# Patient Record
Sex: FEMALE | Race: BLACK OR AFRICAN AMERICAN | Employment: FULL TIME | URBAN - METROPOLITAN AREA
[De-identification: names, ages, dates, MRNs, and addresses within clinical notes are randomized per-mention and may not be internally consistent; named-entity substitution may affect disease eponyms.]

---

## 2017-01-13 ENCOUNTER — HOSPITAL ENCOUNTER (OUTPATIENT)
Dept: SURGERY | Age: 32
Discharge: HOME | End: 2017-01-13
Attending: OBSTETRICS & GYNECOLOGY | Admitting: OBSTETRICS & GYNECOLOGY

## 2017-01-13 VITALS
OXYGEN SATURATION: 100 % | RESPIRATION RATE: 16 BRPM | BODY MASS INDEX: 28.36 KG/M2 | SYSTOLIC BLOOD PRESSURE: 121 MMHG | HEART RATE: 76 BPM | WEIGHT: 160.05 LBS | DIASTOLIC BLOOD PRESSURE: 83 MMHG | HEIGHT: 63 IN | TEMPERATURE: 97.2 F

## 2017-01-13 DIAGNOSIS — N92.0 MENORRHAGIA WITH REGULAR CYCLE: ICD-10-CM

## 2017-01-13 LAB — HCG(URINE) PREGNANCY TEST: NEGATIVE

## 2017-01-13 RX ORDER — SODIUM CHLORIDE 0.9 % (FLUSH) 0.9 %
10 SYRINGE (ML) INJECTION EVERY 12 HOURS SCHEDULED
Status: DISCONTINUED | OUTPATIENT
Start: 2017-01-13 | End: 2017-01-14 | Stop reason: HOSPADM

## 2017-01-13 RX ORDER — HYDROMORPHONE HCL 110MG/55ML
0.5 PATIENT CONTROLLED ANALGESIA SYRINGE INTRAVENOUS EVERY 5 MIN PRN
Status: DISCONTINUED | OUTPATIENT
Start: 2017-01-13 | End: 2017-01-14 | Stop reason: HOSPADM

## 2017-01-13 RX ORDER — LIDOCAINE HYDROCHLORIDE 10 MG/ML
0.5 INJECTION, SOLUTION EPIDURAL; INFILTRATION; INTRACAUDAL; PERINEURAL ONCE
Status: DISCONTINUED | OUTPATIENT
Start: 2017-01-13 | End: 2017-01-14 | Stop reason: HOSPADM

## 2017-01-13 RX ORDER — SODIUM CHLORIDE, SODIUM LACTATE, POTASSIUM CHLORIDE, CALCIUM CHLORIDE 600; 310; 30; 20 MG/100ML; MG/100ML; MG/100ML; MG/100ML
INJECTION, SOLUTION INTRAVENOUS CONTINUOUS
Status: DISCONTINUED | OUTPATIENT
Start: 2017-01-13 | End: 2017-01-14 | Stop reason: HOSPADM

## 2017-01-13 RX ORDER — FENTANYL CITRATE 50 UG/ML
25 INJECTION, SOLUTION INTRAMUSCULAR; INTRAVENOUS EVERY 5 MIN PRN
Status: DISCONTINUED | OUTPATIENT
Start: 2017-01-13 | End: 2017-01-14 | Stop reason: HOSPADM

## 2017-01-13 RX ORDER — KETOROLAC TROMETHAMINE 30 MG/ML
30 INJECTION, SOLUTION INTRAMUSCULAR; INTRAVENOUS
Status: COMPLETED | OUTPATIENT
Start: 2017-01-13 | End: 2017-01-13

## 2017-01-13 RX ORDER — LABETALOL HYDROCHLORIDE 5 MG/ML
5 INJECTION, SOLUTION INTRAVENOUS EVERY 10 MIN PRN
Status: DISCONTINUED | OUTPATIENT
Start: 2017-01-13 | End: 2017-01-14 | Stop reason: HOSPADM

## 2017-01-13 RX ORDER — LIDOCAINE HYDROCHLORIDE 10 MG/ML
1 INJECTION, SOLUTION EPIDURAL; INFILTRATION; INTRACAUDAL; PERINEURAL
Status: ACTIVE | OUTPATIENT
Start: 2017-01-13 | End: 2017-01-13

## 2017-01-13 RX ORDER — OXYCODONE HYDROCHLORIDE AND ACETAMINOPHEN 5; 325 MG/1; MG/1
1-2 TABLET ORAL EVERY 4 HOURS PRN
Qty: 20 TABLET | Refills: 0 | Status: SHIPPED | OUTPATIENT
Start: 2017-01-13 | End: 2018-01-13

## 2017-01-13 RX ORDER — FENTANYL CITRATE 50 UG/ML
50 INJECTION, SOLUTION INTRAMUSCULAR; INTRAVENOUS EVERY 5 MIN PRN
Status: DISCONTINUED | OUTPATIENT
Start: 2017-01-13 | End: 2017-01-14 | Stop reason: HOSPADM

## 2017-01-13 RX ORDER — MEPERIDINE HYDROCHLORIDE 25 MG/ML
12.5 INJECTION INTRAMUSCULAR; INTRAVENOUS; SUBCUTANEOUS EVERY 5 MIN PRN
Status: DISCONTINUED | OUTPATIENT
Start: 2017-01-13 | End: 2017-01-14 | Stop reason: HOSPADM

## 2017-01-13 RX ORDER — ONDANSETRON 2 MG/ML
4 INJECTION INTRAMUSCULAR; INTRAVENOUS
Status: ACTIVE | OUTPATIENT
Start: 2017-01-13 | End: 2017-01-13

## 2017-01-13 RX ORDER — SODIUM CHLORIDE 0.9 % (FLUSH) 0.9 %
10 SYRINGE (ML) INJECTION PRN
Status: DISCONTINUED | OUTPATIENT
Start: 2017-01-13 | End: 2017-01-14 | Stop reason: HOSPADM

## 2017-01-13 RX ORDER — OXYCODONE HYDROCHLORIDE AND ACETAMINOPHEN 5; 325 MG/1; MG/1
2 TABLET ORAL PRN
Status: COMPLETED | OUTPATIENT
Start: 2017-01-13 | End: 2017-01-13

## 2017-01-13 RX ORDER — HYDROMORPHONE HCL 110MG/55ML
0.25 PATIENT CONTROLLED ANALGESIA SYRINGE INTRAVENOUS EVERY 5 MIN PRN
Status: DISCONTINUED | OUTPATIENT
Start: 2017-01-13 | End: 2017-01-14 | Stop reason: HOSPADM

## 2017-01-13 RX ORDER — SODIUM CHLORIDE, SODIUM LACTATE, POTASSIUM CHLORIDE, CALCIUM CHLORIDE 600; 310; 30; 20 MG/100ML; MG/100ML; MG/100ML; MG/100ML
INJECTION, SOLUTION INTRAVENOUS
Status: COMPLETED
Start: 2017-01-13 | End: 2017-01-13

## 2017-01-13 RX ORDER — OXYCODONE HYDROCHLORIDE AND ACETAMINOPHEN 5; 325 MG/1; MG/1
1 TABLET ORAL PRN
Status: COMPLETED | OUTPATIENT
Start: 2017-01-13 | End: 2017-01-13

## 2017-01-13 RX ORDER — IBUPROFEN 800 MG/1
800 TABLET ORAL EVERY 6 HOURS PRN
Qty: 40 TABLET | Refills: 1 | Status: SHIPPED | OUTPATIENT
Start: 2017-01-13 | End: 2018-01-16 | Stop reason: ALTCHOICE

## 2017-01-13 RX ADMIN — OXYCODONE HYDROCHLORIDE AND ACETAMINOPHEN 1 TABLET: 5; 325 TABLET ORAL at 12:29

## 2017-01-13 RX ADMIN — Medication 0.5 MG: at 11:32

## 2017-01-13 RX ADMIN — SODIUM CHLORIDE, SODIUM LACTATE, POTASSIUM CHLORIDE, CALCIUM CHLORIDE: 600; 310; 30; 20 INJECTION, SOLUTION INTRAVENOUS at 09:53

## 2017-01-13 RX ADMIN — KETOROLAC TROMETHAMINE 30 MG: 30 INJECTION, SOLUTION INTRAMUSCULAR; INTRAVENOUS at 09:54

## 2017-01-13 RX ADMIN — Medication 0.5 MG: at 11:50

## 2017-01-13 ASSESSMENT — PAIN SCALES - GENERAL
PAINLEVEL_OUTOF10: 7
PAINLEVEL_OUTOF10: 6
PAINLEVEL_OUTOF10: 6
PAINLEVEL_OUTOF10: 3
PAINLEVEL_OUTOF10: 5
PAINLEVEL_OUTOF10: 5

## 2017-01-13 ASSESSMENT — PAIN DESCRIPTION - LOCATION
LOCATION: ABDOMEN
LOCATION: ABDOMEN

## 2017-01-13 ASSESSMENT — PAIN DESCRIPTION - PAIN TYPE
TYPE: SURGICAL PAIN
TYPE: SURGICAL PAIN

## 2017-01-13 ASSESSMENT — PAIN - FUNCTIONAL ASSESSMENT: PAIN_FUNCTIONAL_ASSESSMENT: 0-10

## 2017-01-13 ASSESSMENT — PAIN DESCRIPTION - DESCRIPTORS: DESCRIPTORS: CRAMPING

## 2017-04-05 ENCOUNTER — OFFICE VISIT (OUTPATIENT)
Dept: INTERNAL MEDICINE CLINIC | Age: 32
End: 2017-04-05

## 2017-04-05 VITALS
WEIGHT: 164 LBS | OXYGEN SATURATION: 98 % | RESPIRATION RATE: 14 BRPM | BODY MASS INDEX: 29.05 KG/M2 | TEMPERATURE: 98.7 F | DIASTOLIC BLOOD PRESSURE: 62 MMHG | HEART RATE: 65 BPM | SYSTOLIC BLOOD PRESSURE: 100 MMHG

## 2017-04-05 DIAGNOSIS — R39.89 GENITAL SORE: ICD-10-CM

## 2017-04-05 DIAGNOSIS — L50.9 URTICARIA OF ENTIRE BODY: Primary | ICD-10-CM

## 2017-04-05 DIAGNOSIS — G43.909 MIGRAINE WITHOUT STATUS MIGRAINOSUS, NOT INTRACTABLE, UNSPECIFIED MIGRAINE TYPE: ICD-10-CM

## 2017-04-05 PROCEDURE — 36415 COLL VENOUS BLD VENIPUNCTURE: CPT | Performed by: FAMILY MEDICINE

## 2017-04-05 PROCEDURE — 99213 OFFICE O/P EST LOW 20 MIN: CPT | Performed by: FAMILY MEDICINE

## 2017-04-05 RX ORDER — BUTALBITAL, ACETAMINOPHEN AND CAFFEINE 50; 325; 40 MG/1; MG/1; MG/1
1 TABLET ORAL EVERY 6 HOURS PRN
Qty: 60 TABLET | Refills: 1 | Status: SHIPPED | OUTPATIENT
Start: 2017-04-05 | End: 2019-05-14

## 2017-04-06 LAB
ALBUMIN SERPL-MCNC: 4.3 G/DL (ref 3.4–5)
ALP BLD-CCNC: 58 U/L (ref 40–129)
ALT SERPL-CCNC: 16 U/L (ref 10–40)
AST SERPL-CCNC: 21 U/L (ref 15–37)
BASOPHILS ABSOLUTE: 0 K/UL (ref 0–0.2)
BASOPHILS RELATIVE PERCENT: 0.7 %
BILIRUB SERPL-MCNC: 0.5 MG/DL (ref 0–1)
BILIRUBIN DIRECT: <0.2 MG/DL (ref 0–0.3)
BILIRUBIN, INDIRECT: NORMAL MG/DL (ref 0–1)
EOSINOPHILS ABSOLUTE: 0.1 K/UL (ref 0–0.6)
EOSINOPHILS RELATIVE PERCENT: 1.1 %
HCT VFR BLD CALC: 38.5 % (ref 36–48)
HEMOGLOBIN: 12.6 G/DL (ref 12–16)
LYMPHOCYTES ABSOLUTE: 2.4 K/UL (ref 1–5.1)
LYMPHOCYTES RELATIVE PERCENT: 37.4 %
MCH RBC QN AUTO: 30.4 PG (ref 26–34)
MCHC RBC AUTO-ENTMCNC: 32.8 G/DL (ref 31–36)
MCV RBC AUTO: 92.9 FL (ref 80–100)
MONOCYTES ABSOLUTE: 0.4 K/UL (ref 0–1.3)
MONOCYTES RELATIVE PERCENT: 6.9 %
NEUTROPHILS ABSOLUTE: 3.5 K/UL (ref 1.7–7.7)
NEUTROPHILS RELATIVE PERCENT: 53.9 %
PDW BLD-RTO: 13 % (ref 12.4–15.4)
PLATELET # BLD: 219 K/UL (ref 135–450)
PMV BLD AUTO: 10 FL (ref 5–10.5)
RBC # BLD: 4.15 M/UL (ref 4–5.2)
TOTAL PROTEIN: 7.2 G/DL (ref 6.4–8.2)
WBC # BLD: 6.4 K/UL (ref 4–11)

## 2017-04-17 ENCOUNTER — TELEPHONE (OUTPATIENT)
Dept: INTERNAL MEDICINE CLINIC | Age: 32
End: 2017-04-17

## 2017-08-30 DIAGNOSIS — L50.8 URTICARIA, ACUTE: ICD-10-CM

## 2017-08-30 RX ORDER — CETIRIZINE HYDROCHLORIDE 10 MG/1
10 TABLET ORAL DAILY
Qty: 30 TABLET | Refills: 3 | Status: SHIPPED | OUTPATIENT
Start: 2017-08-30 | End: 2018-01-16 | Stop reason: DRUGHIGH

## 2017-12-22 LAB
HPV COMMENT: ABNORMAL
HPV TYPE 16: NOT DETECTED
HPV TYPE 18: NOT DETECTED
HPVOH (OTHER TYPES): DETECTED

## 2018-01-16 ENCOUNTER — OFFICE VISIT (OUTPATIENT)
Dept: INTERNAL MEDICINE CLINIC | Age: 33
End: 2018-01-16

## 2018-01-16 VITALS
DIASTOLIC BLOOD PRESSURE: 58 MMHG | SYSTOLIC BLOOD PRESSURE: 114 MMHG | HEIGHT: 63 IN | BODY MASS INDEX: 30.3 KG/M2 | WEIGHT: 171 LBS | HEART RATE: 68 BPM

## 2018-01-16 DIAGNOSIS — K21.9 GASTROESOPHAGEAL REFLUX DISEASE WITHOUT ESOPHAGITIS: ICD-10-CM

## 2018-01-16 DIAGNOSIS — L50.8 URTICARIA, ACUTE: ICD-10-CM

## 2018-01-16 DIAGNOSIS — L29.9 ITCHING: ICD-10-CM

## 2018-01-16 DIAGNOSIS — R07.9 CHEST PAIN, UNSPECIFIED TYPE: Primary | ICD-10-CM

## 2018-01-16 LAB
BILIRUBIN, POC: NORMAL
BLOOD URINE, POC: NORMAL
CLARITY, POC: CLEAR
COLOR, POC: NORMAL
GLUCOSE URINE, POC: NORMAL
KETONES, POC: NORMAL
LEUKOCYTE EST, POC: NORMAL
NITRITE, POC: NORMAL
PH, POC: 5
PROTEIN, POC: NORMAL
SPECIFIC GRAVITY, POC: 1.02
UROBILINOGEN, POC: 1

## 2018-01-16 PROCEDURE — 93000 ELECTROCARDIOGRAM COMPLETE: CPT | Performed by: INTERNAL MEDICINE

## 2018-01-16 PROCEDURE — 81002 URINALYSIS NONAUTO W/O SCOPE: CPT | Performed by: INTERNAL MEDICINE

## 2018-01-16 PROCEDURE — 99205 OFFICE O/P NEW HI 60 MIN: CPT | Performed by: INTERNAL MEDICINE

## 2018-01-16 RX ORDER — OMEPRAZOLE 20 MG/1
20 CAPSULE, DELAYED RELEASE ORAL
Qty: 30 CAPSULE | Refills: 3 | Status: SHIPPED | OUTPATIENT
Start: 2018-01-16 | End: 2018-07-26

## 2018-01-16 RX ORDER — CETIRIZINE HYDROCHLORIDE 10 MG/1
10 TABLET ORAL DAILY
Qty: 30 TABLET | Refills: 3 | Status: SHIPPED | OUTPATIENT
Start: 2018-01-16 | End: 2019-05-14

## 2018-01-16 NOTE — PROGRESS NOTES
Subjective:      Patient ID: Araceli Soler is a 34 y.o. female. HPI-c/o generalized pruritis without rash x 14 months, on QOD Zyrtec, PMD Sharon Mayer  \"wants to know what is causing the itching\"    C/o chest pain with eating, then wants RUQ US done looking for gallstones since she ate fried food before several episodes  Never smoker  Review of Systems-Hospice RN at [facility] Prescriptions on File Prior to Visit   Medication Sig Dispense Refill    cetirizine (ZYRTEC) 10 MG tablet Take 1 tablet by mouth daily (Patient taking differently: Take 10 mg by mouth every other day ) 30 tablet 3    ibuprofen (ADVIL;MOTRIN) 800 MG tablet Take 1 tablet by mouth every 6 hours as needed for Pain 40 tablet 1    butalbital-acetaminophen-caffeine (FIORICET, ESGIC) -40 MG per tablet Take 1 tablet by mouth every 6 hours as needed for Migraine 60 tablet 1     No current facility-administered medications on file prior to visit. Objective:   Physical Exam   Constitutional: She is oriented to person, place, and time. She appears well-developed and well-nourished. BP (!) 114/58   Pulse 68   Ht 5' 3\" (1.6 m)   Wt 171 lb (77.6 kg)   Breastfeeding? No   BMI 30.29 kg/m²      Neck: No JVD present. No thyromegaly present. Cardiovascular: Normal rate, regular rhythm and normal heart sounds. Pulmonary/Chest: Effort normal and breath sounds normal.   Genitourinary: No vaginal discharge found. Musculoskeletal: Normal range of motion. She exhibits no edema or tenderness. Neurological: She is alert and oriented to person, place, and time. She has normal reflexes. Psychiatric: She has a normal mood and affect. Her behavior is normal.   Nursing note and vitals reviewed.     UA-normal  EKG-normal    Assessment:      Patient Active Problem List   Diagnosis    SAB (spontaneous )    S/P D&C (status post dilation and curettage)    Hyperlipemia    Hx LEEP (loop electrosurgical excision procedure), cervix, pregnancy    Menorrhagia with regular cycle    Itching           Plan:      OK to take Zyrtec daily    Check labs, LFT's and eosinophils looking for cause for itching    Upper GI due to dysphagia complaints  RUQ US due to fatty food intolerance  Take Omeprazole 20 mg QD    F/u in 1 month    Amber Wilson

## 2018-01-25 ENCOUNTER — TELEPHONE (OUTPATIENT)
Dept: INTERNAL MEDICINE CLINIC | Age: 33
End: 2018-01-25

## 2018-02-02 ENCOUNTER — OFFICE VISIT (OUTPATIENT)
Dept: INTERNAL MEDICINE CLINIC | Age: 33
End: 2018-02-02

## 2018-02-02 VITALS
DIASTOLIC BLOOD PRESSURE: 58 MMHG | WEIGHT: 171 LBS | BODY MASS INDEX: 30.3 KG/M2 | HEIGHT: 63 IN | SYSTOLIC BLOOD PRESSURE: 102 MMHG | HEART RATE: 64 BPM

## 2018-02-02 DIAGNOSIS — K21.9 GASTROESOPHAGEAL REFLUX DISEASE, ESOPHAGITIS PRESENCE NOT SPECIFIED: Primary | ICD-10-CM

## 2018-02-02 PROCEDURE — 99213 OFFICE O/P EST LOW 20 MIN: CPT | Performed by: INTERNAL MEDICINE

## 2018-02-02 RX ORDER — ATORVASTATIN CALCIUM 10 MG/1
10 TABLET, FILM COATED ORAL DAILY
Qty: 90 TABLET | Refills: 3 | Status: SHIPPED | OUTPATIENT
Start: 2018-02-02 | End: 2018-12-13 | Stop reason: SINTOL

## 2018-02-02 NOTE — PROGRESS NOTES
Subjective:      Patient ID: Shannon Collier is a 34 y.o. female. HPI-wants to see GI re: GERD, she increased PPI herself to 40 mg QD omeprazole since \"20 mg AD wasn't working\", didn't do UGI due to $ issues  RUQ US was WNL  STILL C/O ITCHING x 1.5 years, every day all day, no rash, no new medications    Needs to review labs from last visit    Review of Systems -[facility] was WNL    Wt Readings from Last 3 Encounters:   02/02/18 171 lb (77.6 kg)   01/16/18 171 lb (77.6 kg)   04/05/17 164 lb (74.4 kg)       Current Outpatient Prescriptions on File Prior to Visit   Medication Sig Dispense Refill    cetirizine (ZYRTEC) 10 MG tablet Take 1 tablet by mouth daily 30 tablet 3    omeprazole (PRILOSEC) 20 MG delayed release capsule Take 1 capsule by mouth every morning (before breakfast) (Patient taking differently: Take 40 mg by mouth every morning (before breakfast) ) 30 capsule 3    butalbital-acetaminophen-caffeine (FIORICET, ESGIC) -40 MG per tablet Take 1 tablet by mouth every 6 hours as needed for Migraine 60 tablet 1     No current facility-administered medications on file prior to visit. Objective:   Physical Exam   Constitutional: She is oriented to person, place, and time. She appears well-developed and well-nourished. BP (!) 102/58   Pulse 64   Ht 5' 3\" (1.6 m)   Wt 171 lb (77.6 kg)   BMI 30.29 kg/m²      Neck: No JVD present. No thyromegaly present. Cardiovascular: Normal rate, regular rhythm and normal heart sounds. Pulmonary/Chest: Effort normal and breath sounds normal.   Genitourinary: No vaginal discharge found. Musculoskeletal: Normal range of motion. She exhibits no edema or tenderness. Neurological: She is alert and oriented to person, place, and time. She has normal reflexes. Psychiatric: She has a normal mood and affect. Her behavior is normal.   Nursing note and vitals reviewed.     Lab Results   Component Value Date    WBC 6.4 04/05/2017    HGB 12.6 04/05/2017    HCT

## 2018-03-08 ENCOUNTER — OFFICE VISIT (OUTPATIENT)
Dept: ORTHOPEDIC SURGERY | Age: 33
End: 2018-03-08

## 2018-03-08 VITALS
DIASTOLIC BLOOD PRESSURE: 66 MMHG | SYSTOLIC BLOOD PRESSURE: 101 MMHG | WEIGHT: 166 LBS | BODY MASS INDEX: 29.41 KG/M2 | HEIGHT: 63 IN | HEART RATE: 64 BPM

## 2018-03-08 DIAGNOSIS — S83.002A PATELLAR SUBLUXATION, LEFT, INITIAL ENCOUNTER: ICD-10-CM

## 2018-03-08 DIAGNOSIS — M25.562 ACUTE PAIN OF BOTH KNEES: Primary | ICD-10-CM

## 2018-03-08 DIAGNOSIS — M25.561 ACUTE PAIN OF BOTH KNEES: Primary | ICD-10-CM

## 2018-03-08 DIAGNOSIS — S83.001A PATELLAR SUBLUXATION, RIGHT, INITIAL ENCOUNTER: ICD-10-CM

## 2018-03-08 PROCEDURE — 99203 OFFICE O/P NEW LOW 30 MIN: CPT | Performed by: PHYSICIAN ASSISTANT

## 2018-03-08 PROCEDURE — L1812 KO ELASTIC W/JOINTS PRE OTS: HCPCS | Performed by: PHYSICIAN ASSISTANT

## 2018-03-08 NOTE — PATIENT INSTRUCTIONS
liability for your use of this information. Patellofemoral Pain Syndrome: Care Instructions  Your Care Instructions  Patellofemoral pain syndrome is pain in the front of the knee. It is caused by overuse, weak thigh muscles (quadriceps), or a problem with the way the kneecap moves. Extra weight may also cause this syndrome. The patella is the kneecap, and the femur is the thighbone. Some people may have pain in the front of the knee from a condition called chondromalacia. In this problem, the underside of the knee cartilage wears down and frays. Cartilage is a rubbery tissue that cushions joints. In some cases, the kneecap does not move, or track, in a normal way. You may have knee pain when you run, walk down hills or steps, or do another activity. Sitting for a long time also can cause knee pain. Your knee pain may get better with medicines for pain and swelling and exercises to make your quadriceps stronger. Losing weight, if you need to, may also help with pain. Follow-up care is a key part of your treatment and safety. Be sure to make and go to all appointments, and call your doctor if you are having problems. It's also a good idea to know your test results and keep a list of the medicines you take. How can you care for yourself at home? · Ask your doctor if you can take an over-the-counter pain medicine, such as acetaminophen (Tylenol), ibuprofen (Advil, Motrin), or naproxen (Aleve). Be safe with medicines. Read and follow all instructions on the label. · Rest and protect your knee. Take a break from activities that cause pain, such as long periods of sitting or kneeling. · Put ice or a cold pack on your knee for 10 to 20 minutes after activity. Put a thin cloth between the ice and your skin. · If your doctor recommends an elastic bandage, sleeve, or other type of support for your knee, wear it as directed.   · If your knee is not swollen, you can put moist heat, a heating pad, or a warm cloth

## 2018-03-08 NOTE — PROGRESS NOTES
understanding of these issues and questions were answered. I am recommending a J brace for the left knee since left knee is more symptomatic. Procedures    OTS Hinged Lateral Stabilizer     Patient was prescribed a Breg Hinged Lateral Stabilizer. The left knee will require stabilization / immobilization from this semi-rigid / rigid orthosis to improve their function. The orthosis will assist in protecting the affected area, provide functional support and facilitate healing. The patient was educated and fit by a healthcare professional with expert knowledge and specialization in brace application while under the direct supervision of the physician. Verbal and written instructions for the use of and application of this item were provided. They were instructed to contact the office immediately should the brace result in increased pain, decreased sensation, increased swelling or worsening of the condition. The need for a therapy rehab program for quad/ VMO strengthening discussed. PT prescription was given. Follow Up: 6 weeks. Call or return to clinic prn if these symptoms worsen or fail to improve as anticipated.

## 2018-03-20 NOTE — COMMUNICATION BODY
Subjective:      Patient ID: Kiana Cabezas is a 34 y.o.  female. Chief Complaint   Patient presents with    Knee Pain     Bilateral knee pain started after running         HPI:   She is here for an initial evaluation of bilateral anterior knee pain. Onset of symptoms 3 weeks. These symptoms have been progressive in nature. There is not a history of injury. Recently started running on a treadmill for exercise. Pain is intermittent, moderate. Location of pain- anterior knee. Pain is on average 5/10. Left knee worse than right knee  Pain is worse with steps, squats,inclines and declines. Pain improves with rest, elevation and ice. There is not associated numbness/ tingling. Previous treatments have included: Tylenol without relief or improvement. Review of Systems:   Negative for fever or chills. Negative for numbness or tingling in the lower extremity.       Past Medical History:   Diagnosis Date    Hyperlipemia     Hyperlipidemia LDL goal <160        Family History   Problem Relation Age of Onset    Diabetes Mother      \"pre\"    Crohn's Disease Other      maternal uncle/cousin    Diabetes Paternal Grandfather     Bipolar Disorder Paternal Grandmother     Diabetes Maternal Aunt      \"pre\"        Past Surgical History:   Procedure Laterality Date    DILATION AND CURETTAGE OF UTERUS  2007    DILATION AND CURETTAGE OF UTERUS  9/11/13    HYSTEROSCOPY  01/13/2017    endometrial ablation- prem ESPINOSA  2003 & 2005    SALPINGO-OOPHORECTOMY Left 2015    1.7 cm fibroma- Dr Jessica Giles Bilateral 2014       Social History     Occupational History    nurse      hospice      Social History Main Topics    Smoking status: Never Smoker    Smokeless tobacco: Never Used    Alcohol use 3.0 oz/week     5 Glasses of wine per week      Comment: once a month (1-2 [place] ice tea/month)    Drug use: No    Sexual activity: Yes       Current Outpatient Prescriptions flexors and EHL testing. Gait is not antalgic. Normal heel to toe. Negative Lamar's Sign. SLR negative. VASCULAR EXAM:  Examination of the lower extremities shows intact perfusion to all extremities. No cyanosis. Digits are warm to touch, capillary refill is less than 2 seconds. no edema noted. SKIN:  Examination of the lower extremity skin reveals: The skin to be intact without lacerations or abrasions. No significant erythema. No rashes or skin lesions. X Rays: performed in the office today:   AP Standing,Lateral and Sunrise of bilateral Knee: Mild patellar subluxation noted. Otherwise, the alignment is anatomic. There are no radiographic findings to suggest fracture or dislocation. Assessment:       ICD-10-CM ICD-9-CM    1. Acute pain of both knees M25.561 338.19 XR Knee Bilateral Standing    M25.562 719.46 XR KNEE LEFT (1-2 VIEWS)      XR KNEE RIGHT (1-2 VIEWS)   2. Patellar subluxation, left, initial encounter S83.002A 836.3 OTS Hinged Lateral Stabilizer   3. Patellar subluxation, right, initial encounter S83.001A 836.3         Plan:     The natural history of the patient's diagnosis as well as the treatment options were discussed in full and questions were answered. Risks and benefits of the treatment options also reviewed in detail. Rest, Ice, Compression and Elevation  OTC NSAID'S discussed to be taken in appropriate  therapeutic doses. Activity restriction/ Modification discussed. Avoidance of deep knee bends, squats and steps    The patient was advised that NSAID-type medications have two very important potential side effects: gastrointestinal irritation including hemorrhage and renal injuries. She was asked to take the medication with food and to stop if she experiences any GI upset. I asked her to call for vomiting, abdominal pain or black/bloody stools. He should have renal function testing per his medical provider periodically.   The patient expresses understanding of these issues and questions were answered. I am recommending a J brace for the left knee since left knee is more symptomatic. Procedures    OTS Hinged Lateral Stabilizer     Patient was prescribed a Breg Hinged Lateral Stabilizer. The left knee will require stabilization / immobilization from this semi-rigid / rigid orthosis to improve their function. The orthosis will assist in protecting the affected area, provide functional support and facilitate healing. The patient was educated and fit by a healthcare professional with expert knowledge and specialization in brace application while under the direct supervision of the physician. Verbal and written instructions for the use of and application of this item were provided. They were instructed to contact the office immediately should the brace result in increased pain, decreased sensation, increased swelling or worsening of the condition. The need for a therapy rehab program for quad/ VMO strengthening discussed. PT prescription was given. Follow Up: 6 weeks. Call or return to clinic prn if these symptoms worsen or fail to improve as anticipated.

## 2018-07-26 ENCOUNTER — HOSPITAL ENCOUNTER (OUTPATIENT)
Dept: OTHER | Age: 33
Discharge: HOME | End: 2018-07-26
Attending: INTERNAL MEDICINE | Admitting: INTERNAL MEDICINE

## 2018-07-26 ENCOUNTER — OFFICE VISIT (OUTPATIENT)
Dept: INTERNAL MEDICINE CLINIC | Age: 33
End: 2018-07-26

## 2018-07-26 VITALS
HEIGHT: 63 IN | SYSTOLIC BLOOD PRESSURE: 110 MMHG | HEART RATE: 76 BPM | DIASTOLIC BLOOD PRESSURE: 64 MMHG | WEIGHT: 169 LBS | BODY MASS INDEX: 29.95 KG/M2

## 2018-07-26 DIAGNOSIS — E78.00 PURE HYPERCHOLESTEROLEMIA: Primary | ICD-10-CM

## 2018-07-26 DIAGNOSIS — K21.9 GASTROESOPHAGEAL REFLUX DISEASE, ESOPHAGITIS PRESENCE NOT SPECIFIED: ICD-10-CM

## 2018-07-26 DIAGNOSIS — E78.00 PURE HYPERCHOLESTEROLEMIA: ICD-10-CM

## 2018-07-26 LAB
A/G RATIO: 1.3 (ref 1.1–2.2)
ALBUMIN SERPL-MCNC: 4.4 G/DL (ref 3.4–5)
ALP BLD-CCNC: 57 U/L (ref 40–129)
ALT SERPL-CCNC: 17 U/L (ref 10–40)
AMYLASE: 56 U/L (ref 25–115)
ANION GAP SERPL CALCULATED.3IONS-SCNC: 8 MMOL/L (ref 3–16)
AST SERPL-CCNC: 19 U/L (ref 15–37)
BILIRUB SERPL-MCNC: 0.5 MG/DL (ref 0–1)
BUN BLDV-MCNC: 13 MG/DL (ref 7–20)
CALCIUM SERPL-MCNC: 9.6 MG/DL (ref 8.3–10.6)
CHLORIDE BLD-SCNC: 106 MMOL/L (ref 99–110)
CHOLESTEROL, TOTAL: 248 MG/DL (ref 0–199)
CO2: 24 MMOL/L (ref 21–32)
CREAT SERPL-MCNC: 0.9 MG/DL (ref 0.6–1.1)
GFR AFRICAN AMERICAN: >60
GFR NON-AFRICAN AMERICAN: >60
GLOBULIN: 3.4 G/DL
GLUCOSE BLD-MCNC: 91 MG/DL (ref 70–99)
HCT VFR BLD CALC: 39.4 % (ref 36–48)
HDLC SERPL-MCNC: 72 MG/DL (ref 40–60)
HEMOGLOBIN: 13.1 G/DL (ref 12–16)
LDL CHOLESTEROL CALCULATED: 167 MG/DL
MCH RBC QN AUTO: 30.5 PG (ref 26–34)
MCHC RBC AUTO-ENTMCNC: 33.2 G/DL (ref 31–36)
MCV RBC AUTO: 91.8 FL (ref 80–100)
PDW BLD-RTO: 13.9 % (ref 12.4–15.4)
PLATELET # BLD: 227 K/UL (ref 135–450)
PMV BLD AUTO: 10.2 FL (ref 5–10.5)
POTASSIUM SERPL-SCNC: 4.4 MMOL/L (ref 3.5–5.1)
RBC # BLD: 4.29 M/UL (ref 4–5.2)
SODIUM BLD-SCNC: 138 MMOL/L (ref 136–145)
TOTAL PROTEIN: 7.8 G/DL (ref 6.4–8.2)
TRIGL SERPL-MCNC: 46 MG/DL (ref 0–150)
VLDLC SERPL CALC-MCNC: 9 MG/DL
WBC # BLD: 9.2 K/UL (ref 4–11)

## 2018-07-26 PROCEDURE — 99214 OFFICE O/P EST MOD 30 MIN: CPT | Performed by: INTERNAL MEDICINE

## 2018-07-26 NOTE — PROGRESS NOTES
Subjective:      Patient ID: Gwenith Niru is a 34 y.o. female. HPI-wants to restart prescription med for her GERD symptoms    XS gas with any foods    Was restarted on Omeprazole 40 mg QD 5 months ago and was referred to GI then but she never went    Then c/o lactose intolerance issues as well    Refused UGI due to co-pay $    Review of Systems     Current Outpatient Prescriptions on File Prior to Visit   Medication Sig Dispense Refill    naproxen (NAPROSYN) 500 MG tablet Take 1 tablet by mouth 2 times daily 60 tablet 0    ondansetron (ZOFRAN ODT) 4 MG disintegrating tablet Take 1 tablet by mouth every 8 hours as needed for Nausea 20 tablet 0    atorvastatin (LIPITOR) 10 MG tablet Take 1 tablet by mouth daily 90 tablet 3    cetirizine (ZYRTEC) 10 MG tablet Take 1 tablet by mouth daily 30 tablet 3    omeprazole (PRILOSEC) 20 MG delayed release capsule Take 1 capsule by mouth every morning (before breakfast) (Patient taking differently: Take 40 mg by mouth every morning (before breakfast) ) 30 capsule 3    butalbital-acetaminophen-caffeine (FIORICET, ESGIC) -40 MG per tablet Take 1 tablet by mouth every 6 hours as needed for Migraine 60 tablet 1     No current facility-administered medications on file prior to visit. Objective:   Physical Exam   Constitutional: She is oriented to person, place, and time. She appears well-developed and well-nourished. /64   Pulse 76   Ht 5' 3\" (1.6 m)   Wt 169 lb (76.7 kg)   BMI 29.94 kg/m²        Neck: No JVD present. No thyromegaly present. Cardiovascular: Normal rate, regular rhythm and normal heart sounds. Pulmonary/Chest: Effort normal and breath sounds normal.   Genitourinary: No vaginal discharge found. Musculoskeletal: Normal range of motion. She exhibits no edema or tenderness. Neurological: She is alert and oriented to person, place, and time. She has normal reflexes. Psychiatric: She has a normal mood and affect.  Her behavior is normal.   Nursing note and vitals reviewed. Lab Results   Component Value Date    CHOL 211 (H) 2015    CHOL 246 (H) 10/12/2013    CHOL 241 (H) 2013     Lab Results   Component Value Date    TRIG 78 2015    TRIG 61 10/12/2013    TRIG 41 2013     Lab Results   Component Value Date    HDL 45 2015    HDL 58 10/12/2013    HDL 53 2013     Lab Results   Component Value Date    LDLCALC 150 (H) 2015    LDLCALC 176 (H) 10/12/2013    LDLCALC 180 (H) 2013     Lab Results   Component Value Date    LABVLDL 16 2015    LABVLDL 12 10/12/2013    LABVLDL 8 2013     No results found for: CHOLHDLRATIO    Assessment:      Patient Active Problem List   Diagnosis    SAB (spontaneous )    S/P D&C (status post dilation and curettage)    Hyperlipemia    Hx LEEP (loop electrosurgical excision procedure), cervix, pregnancy    Menorrhagia with regular cycle    Itching    Gastroesophageal reflux disease without esophagitis    Patellar subluxation, left, initial encounter    Acute pain of both knees           Plan: Add high fiber diet Metamucil or Citrucel or Benefiber  Encourage po fluids    GI for EGD, ?  IBS    Restart omeprazole due to recurrent GERD symptoms    Repeat fasting labs since on Lipitor for past 6 months  Add amylase due to epigastric distress reports today     f/u in 1 month    Tanya Marte

## 2018-07-30 ENCOUNTER — TELEPHONE (OUTPATIENT)
Dept: INTERNAL MEDICINE CLINIC | Age: 33
End: 2018-07-30

## 2018-07-30 NOTE — TELEPHONE ENCOUNTER
----- Message from Sacha Cadena MD sent at 7/27/2018 10:46 AM EDT -----  Labs unremarkable  Follow-up as scheduled    Sacha Cadena

## 2018-09-28 LAB
CANDIDA SPECIES, DNA PROBE: ABNORMAL
GARDNERELLA VAGINALIS, DNA PROBE: ABNORMAL
TRICHOMONAS VAGINALIS DNA: ABNORMAL

## 2018-11-03 LAB
CANDIDA SPECIES, DNA PROBE: NORMAL
GARDNERELLA VAGINALIS, DNA PROBE: NORMAL
TRICHOMONAS VAGINALIS DNA: NORMAL

## 2018-11-15 ENCOUNTER — OFFICE VISIT (OUTPATIENT)
Dept: INTERNAL MEDICINE CLINIC | Age: 33
End: 2018-11-15

## 2018-11-15 VITALS
HEART RATE: 82 BPM | BODY MASS INDEX: 30.29 KG/M2 | OXYGEN SATURATION: 99 % | SYSTOLIC BLOOD PRESSURE: 110 MMHG | WEIGHT: 171 LBS | DIASTOLIC BLOOD PRESSURE: 60 MMHG

## 2018-11-15 DIAGNOSIS — F33.8 SEASONAL AFFECTIVE DISORDER (HCC): Primary | ICD-10-CM

## 2018-11-15 PROCEDURE — 99213 OFFICE O/P EST LOW 20 MIN: CPT | Performed by: INTERNAL MEDICINE

## 2018-11-15 RX ORDER — CITALOPRAM 20 MG/1
20 TABLET ORAL DAILY
Qty: 30 TABLET | Refills: 0 | Status: CANCELLED | OUTPATIENT
Start: 2018-11-15

## 2018-11-15 RX ORDER — PAROXETINE HYDROCHLORIDE 25 MG/1
25 TABLET, FILM COATED, EXTENDED RELEASE ORAL DAILY
Qty: 30 TABLET | Refills: 0 | Status: SHIPPED | OUTPATIENT
Start: 2018-11-15 | End: 2018-12-13 | Stop reason: SINTOL

## 2018-11-16 ENCOUNTER — HOSPITAL ENCOUNTER (OUTPATIENT)
Age: 33
Discharge: HOME | End: 2018-11-16
Payer: COMMERCIAL

## 2018-11-16 DIAGNOSIS — F33.8 SEASONAL AFFECTIVE DISORDER (HCC): ICD-10-CM

## 2018-11-16 LAB — TSH SERPL DL<=0.05 MIU/L-ACNC: 0.68 UIU/ML (ref 0.27–4.2)

## 2018-11-16 PROCEDURE — 36415 COLL VENOUS BLD VENIPUNCTURE: CPT

## 2018-11-16 PROCEDURE — 84443 ASSAY THYROID STIM HORMONE: CPT

## 2018-11-19 ENCOUNTER — TELEPHONE (OUTPATIENT)
Dept: INTERNAL MEDICINE CLINIC | Age: 33
End: 2018-11-19

## 2018-11-19 NOTE — TELEPHONE ENCOUNTER
----- Message from Esperanza Carvajal MD sent at 11/16/2018  6:02 PM EST -----  Thyroid is OK      F/u as planned    Esperanza Carvajal

## 2018-12-13 ENCOUNTER — OFFICE VISIT (OUTPATIENT)
Dept: INTERNAL MEDICINE CLINIC | Age: 33
End: 2018-12-13

## 2018-12-13 VITALS
DIASTOLIC BLOOD PRESSURE: 60 MMHG | WEIGHT: 171 LBS | BODY MASS INDEX: 30.3 KG/M2 | SYSTOLIC BLOOD PRESSURE: 104 MMHG | HEART RATE: 68 BPM | HEIGHT: 63 IN

## 2018-12-13 PROCEDURE — 99214 OFFICE O/P EST MOD 30 MIN: CPT | Performed by: INTERNAL MEDICINE

## 2018-12-13 RX ORDER — BUPROPION HYDROCHLORIDE 150 MG/1
150 TABLET, EXTENDED RELEASE ORAL 2 TIMES DAILY
Qty: 60 TABLET | Refills: 3 | Status: SHIPPED | OUTPATIENT
Start: 2018-12-13 | End: 2019-02-28

## 2018-12-28 LAB
HPV COMMENT: NORMAL
HPV TYPE 16: NOT DETECTED
HPV TYPE 18: NOT DETECTED
HPVOH (OTHER TYPES): NOT DETECTED

## 2019-01-23 LAB — URINE CULTURE, ROUTINE: NORMAL

## 2019-02-28 ENCOUNTER — OFFICE VISIT (OUTPATIENT)
Dept: INTERNAL MEDICINE CLINIC | Age: 34
End: 2019-02-28

## 2019-02-28 VITALS — DIASTOLIC BLOOD PRESSURE: 60 MMHG | SYSTOLIC BLOOD PRESSURE: 110 MMHG

## 2019-02-28 DIAGNOSIS — M54.2 CERVICALGIA: Primary | ICD-10-CM

## 2019-02-28 PROCEDURE — 99214 OFFICE O/P EST MOD 30 MIN: CPT | Performed by: INTERNAL MEDICINE

## 2019-02-28 RX ORDER — TIZANIDINE 4 MG/1
4 TABLET ORAL 3 TIMES DAILY
Qty: 30 TABLET | Refills: 0 | Status: SHIPPED | OUTPATIENT
Start: 2019-02-28 | End: 2019-05-14

## 2019-02-28 RX ORDER — PREDNISONE 10 MG/1
TABLET ORAL
Qty: 53 TABLET | Refills: 0 | Status: SHIPPED | OUTPATIENT
Start: 2019-02-28 | End: 2019-03-10

## 2019-03-14 ENCOUNTER — OFFICE VISIT (OUTPATIENT)
Dept: INTERNAL MEDICINE CLINIC | Age: 34
End: 2019-03-14

## 2019-03-14 VITALS
BODY MASS INDEX: 30.3 KG/M2 | SYSTOLIC BLOOD PRESSURE: 114 MMHG | WEIGHT: 171 LBS | DIASTOLIC BLOOD PRESSURE: 62 MMHG | HEIGHT: 63 IN | HEART RATE: 64 BPM

## 2019-03-14 DIAGNOSIS — F31.31 BIPOLAR AFFECTIVE DISORDER, CURRENTLY DEPRESSED, MILD (HCC): ICD-10-CM

## 2019-03-14 DIAGNOSIS — M54.2 CERVICALGIA: Primary | ICD-10-CM

## 2019-03-14 PROCEDURE — 99213 OFFICE O/P EST LOW 20 MIN: CPT | Performed by: INTERNAL MEDICINE

## 2019-04-11 ENCOUNTER — OFFICE VISIT (OUTPATIENT)
Dept: INTERNAL MEDICINE CLINIC | Age: 34
End: 2019-04-11

## 2019-04-11 VITALS
DIASTOLIC BLOOD PRESSURE: 68 MMHG | WEIGHT: 170 LBS | OXYGEN SATURATION: 100 % | HEART RATE: 82 BPM | TEMPERATURE: 98.6 F | BODY MASS INDEX: 30.11 KG/M2 | SYSTOLIC BLOOD PRESSURE: 110 MMHG

## 2019-04-11 DIAGNOSIS — J02.9 PHARYNGITIS, UNSPECIFIED ETIOLOGY: Primary | ICD-10-CM

## 2019-04-11 PROCEDURE — 99214 OFFICE O/P EST MOD 30 MIN: CPT | Performed by: INTERNAL MEDICINE

## 2019-04-11 RX ORDER — PENICILLIN V POTASSIUM 500 MG/1
500 TABLET ORAL 4 TIMES DAILY
Qty: 40 TABLET | Refills: 0 | Status: SHIPPED | OUTPATIENT
Start: 2019-04-11 | End: 2019-04-21

## 2019-04-11 NOTE — PROGRESS NOTES
Subungual infection/nodule ? etiology      Plan:      WESLEY CUEVAS QID    ENT evaluation     Ellen Chamberlain MD

## 2019-04-12 ENCOUNTER — TELEPHONE (OUTPATIENT)
Dept: INTERNAL MEDICINE CLINIC | Age: 34
End: 2019-04-12

## 2019-04-12 NOTE — TELEPHONE ENCOUNTER
Pt called given antib here yesterday  However, she says she gets yeast infections and would like Diflucan called in to [facility]  rsl

## 2019-04-12 NOTE — TELEPHONE ENCOUNTER
Advise OTC Monistat-7 vag cream at hs instead if she develops any Candida symptoms  This antibiotic would be less likely to cause yeast infection)    Shea Rios

## 2019-04-17 ENCOUNTER — OFFICE VISIT (OUTPATIENT)
Dept: ENT CLINIC | Age: 34
End: 2019-04-17
Payer: COMMERCIAL

## 2019-04-17 VITALS
BODY MASS INDEX: 31.01 KG/M2 | DIASTOLIC BLOOD PRESSURE: 69 MMHG | WEIGHT: 175 LBS | HEIGHT: 63 IN | SYSTOLIC BLOOD PRESSURE: 101 MMHG | HEART RATE: 64 BPM

## 2019-04-17 DIAGNOSIS — R59.0 LYMPHADENOPATHY, SUBMENTAL: ICD-10-CM

## 2019-04-17 DIAGNOSIS — H93.13 SUBJECTIVE TINNITUS OF BOTH EARS: ICD-10-CM

## 2019-04-17 DIAGNOSIS — R22.1 NECK MASS: Primary | ICD-10-CM

## 2019-04-17 PROCEDURE — 99203 OFFICE O/P NEW LOW 30 MIN: CPT | Performed by: OTOLARYNGOLOGY

## 2019-04-17 RX ORDER — LAMOTRIGINE 100 MG/1
100 TABLET ORAL DAILY
COMMUNITY
Start: 2019-04-15

## 2019-04-17 RX ORDER — LAMOTRIGINE 25 MG/1
TABLET ORAL
Refills: 0 | COMMUNITY
Start: 2019-03-26 | End: 2019-05-14

## 2019-04-17 NOTE — PATIENT INSTRUCTIONS
1. Finish your current antibiotic. 2. Schedule a hearing test.      3. We discussed the alternatives of surgical excision, with resultant scar, versus fine needle aspiration for cytology study, versus observation for enlargement of 2 - 3 times, or a change to rock hard consistency, or fixation (nodule no longer moves around). You chose observation at this time. Check once a week and report any of these changes.

## 2019-04-17 NOTE — PROGRESS NOTES
[facility]       NEW PATIENT VISIT    PCP:  Michael Plummer MD    REFERRED BY:   Michael Plummer MD        CHIEF COMPLAINT:  Chief Complaint   Patient presents with    Pharyngitis       HISTORY OF PRESENT ILLNESS:       (Location, Quality, Severity, Duration, Timing, Context, Modifying factors, Associated symptoms)  Trey Valdez is a 28 y.o. female. A consultation was requested by Dr. Andree Barry for an opinion or advice regarding a problem located in the neck, submental area. The quality is lump, \"like a hard knot. \"  The severity is mild. The duration is 8 days. \"It appeared on April 9th. It was painful so I started rubbing around there and felt that knot and it was very painful for 3 days. I saw Dr. Andree Barry on 4/11 and he prescribed penicillin. \"  Since then, it has decreased in size and pain. \"It's not painful or tender today. \"  The timing is constant. It is much smaller\", at about \"15%\" of maximum size. The context is spontaneous onset with no sore throat or any other symptoms. Modifying factors include antibiotic. Associated symptoms include none. REVIEW OF SYSTEMS:    CONSTITUTIONAL:  Denied fever. Denied unexplained weight loss. EARS, NOSE, THROAT:  (+) tinnitus, ringing or high pitch sound, both ears, for two weeks, and never before. Denied otorrhea, otalgia, hearing loss, nasal dyspnea, rhinorrhea, sore throat and hoarseness. PAST MEDICAL HISTORY:    Past Medical History:   Diagnosis Date    Hyperlipemia     Hyperlipidemia LDL goal <160          Past Surgical History:   Procedure Laterality Date    DILATION AND CURETTAGE OF UTERUS  2007    DILATION AND CURETTAGE OF UTERUS  9/11/13    HYSTEROSCOPY  01/13/2017    endometrial ablation- [facility]    LEEPATRICIA  2003 & 2005    SALPINGO-OOPHORECTOMY Left 2015    1.7 cm fibroma- Dr Jeb Bear Bilateral 2014         EXAMINATION:    VITALS SIGNS reviewed.    Vitals:    04/17/19 0955   BP: 101/69 Site: Left Upper Arm   Position: Sitting   Cuff Size: Medium Adult   Pulse: 64   Weight: 175 lb (79.4 kg)   Height: 5' 3\" (1.6 m)     GENERAL APPEARANCE: WDWN NAD, A&Ox3. ABILITY TO COMMUNICATE/QUALITY OF VOICE:  Normal, no hoarseness or hot potato quality. SALIVARY GLANDS:  Parotid gland and submandibular gland normal bilaterally. INSPECTION, HEAD AND FACE:  Normal with no masses, lesions, tenderness, or deformities. FACIAL STRENGTH, MOTION:  Facial nerve function intact and equal bilaterally for all 5 branches. SINUSES:  Frontal sinuses and maxillary sinuses nontender, bilaterally. EXTERNAL EAR/NOSE:  The pinnae, mastoids, and external nose were normal bilaterally. EARS, OTOSCOPY:  The tympanic membranes and external auditory canals were normal bilaterally. NOSE:  The nasal septum was midline. The inferior turbinates were normal bilaterally. Nasal mucosa and nasal secretions were normal bilaterally. LIPS, TEETH AND GUMS:  Normal.  OROPHARYNX/ORAL CAVITY:  Normal mucosa with no ulcerations, masses, lesions, erythema, exudate, or structural abnormalities. (+) NECK:  There was an 8-9 mm nontender, mobile, mass in the midline submental neck consistent with lymphadenopathy. Normal with no masses, tenderness, or tracheal deviation. THYROID:  Normal, with no nodules, goiter, or tenderness bilaterally. LYMPH NODES, CERVICAL, FACIAL AND SUPRACLAVICULAR:  No lymphadenopathy detected. Brain Polanco / Vania Pont / ORDERS:       Deann Miguel was seen today for pharyngitis. Diagnoses and all orders for this visit:    Neck mass  Comments:  submental    Subjective tinnitus of both ears    Lymphadenopathy, submental        RECOMMENDATIONS/PLAN:    1. Finish penicillin. 2. See Patient Instructions on file for this visit. 3. Return in about 1 month (around 5/17/2019) for recheck/follow-up, and sooner if condition worsens.         >> Please note portions of this note were completed with a voice recognition program.  Efforts were made to edit the dictations but occasionally words are mis-transcribed.

## 2019-04-23 ENCOUNTER — OFFICE VISIT (OUTPATIENT)
Dept: AUDIOLOGY | Age: 34
End: 2019-04-23
Payer: COMMERCIAL

## 2019-04-23 DIAGNOSIS — H93.12 TINNITUS OF LEFT EAR: Primary | ICD-10-CM

## 2019-04-23 PROCEDURE — 92557 COMPREHENSIVE HEARING TEST: CPT | Performed by: AUDIOLOGIST

## 2019-04-23 PROCEDURE — 92550 TYMPANOMETRY & REFLEX THRESH: CPT | Performed by: AUDIOLOGIST

## 2019-05-14 ENCOUNTER — OFFICE VISIT (OUTPATIENT)
Dept: ENT CLINIC | Age: 34
End: 2019-05-14
Payer: COMMERCIAL

## 2019-05-14 VITALS
TEMPERATURE: 98.2 F | DIASTOLIC BLOOD PRESSURE: 59 MMHG | OXYGEN SATURATION: 98 % | SYSTOLIC BLOOD PRESSURE: 97 MMHG | HEART RATE: 62 BPM | BODY MASS INDEX: 30.82 KG/M2 | WEIGHT: 174 LBS

## 2019-05-14 DIAGNOSIS — J02.9 ACUTE PHARYNGITIS, UNSPECIFIED ETIOLOGY: Primary | ICD-10-CM

## 2019-05-14 DIAGNOSIS — H90.A12 CONDUCTIVE HEARING LOSS OF LEFT EAR WITH RESTRICTED HEARING OF RIGHT EAR: ICD-10-CM

## 2019-05-14 DIAGNOSIS — H93.13 SUBJECTIVE TINNITUS OF BOTH EARS: ICD-10-CM

## 2019-05-14 PROCEDURE — 99214 OFFICE O/P EST MOD 30 MIN: CPT | Performed by: OTOLARYNGOLOGY

## 2019-05-14 RX ORDER — AZITHROMYCIN 250 MG/1
250 TABLET, FILM COATED ORAL SEE ADMIN INSTRUCTIONS
Qty: 6 TABLET | Refills: 0 | Status: SHIPPED | OUTPATIENT
Start: 2019-05-14 | End: 2019-05-19

## 2019-05-14 NOTE — PATIENT INSTRUCTIONS
· You should return for an annual hearing test to monitor your hearing, and whenever your hearing further decreases significantly. · You should employ the tinnitus masking techniques and strategies we discussed, as needed. Bedside tinnitus masking devices (eg. a white noise machine, noise machine, noise heri on your cell phone, fan on in the room, radio with light music or tuned between stations to white noise static) can be very helpful. · You should avoid exposure to excessively high levels of noise/sound and use hearing protection measures we discussed, as needed, if such exposure is unavoidable. · NO Q-TIPS IN THE EARS  You should never clean your ears with a Q-tip, cotton tipped applicator, Nila pin, paper clip, or any other instrument. I recommend only use of one the several ear wax removal kits available \"over the counter\" (eg. [employer] or Murine, or [employer]) if you feel a need to try to remove ear wax. No other methods should be self used for this purpose as there is danger of injury to the ear and risk of irreparable and irreversible permanent hearing loss.

## 2019-06-19 LAB — URINE CULTURE, ROUTINE: NORMAL

## 2019-08-15 LAB
GENITAL CULTURE, ROUTINE: ABNORMAL
GENITAL CULTURE, ROUTINE: ABNORMAL
ORGANISM: ABNORMAL

## 2019-12-06 LAB — GENITAL CULTURE, ROUTINE: NORMAL

## 2019-12-18 ENCOUNTER — HOSPITAL ENCOUNTER (EMERGENCY)
Age: 34
Discharge: HOME | End: 2019-12-18
Payer: COMMERCIAL

## 2019-12-18 ENCOUNTER — APPOINTMENT (OUTPATIENT)
Dept: GENERAL RADIOLOGY | Age: 34
End: 2019-12-18
Payer: COMMERCIAL

## 2019-12-18 VITALS
TEMPERATURE: 97.9 F | RESPIRATION RATE: 16 BRPM | HEART RATE: 77 BPM | OXYGEN SATURATION: 100 % | DIASTOLIC BLOOD PRESSURE: 72 MMHG | SYSTOLIC BLOOD PRESSURE: 117 MMHG

## 2019-12-18 DIAGNOSIS — M54.12 LEFT CERVICAL RADICULOPATHY: Primary | ICD-10-CM

## 2019-12-18 DIAGNOSIS — G89.29 CHRONIC NECK PAIN: ICD-10-CM

## 2019-12-18 DIAGNOSIS — M54.2 CHRONIC NECK PAIN: ICD-10-CM

## 2019-12-18 PROCEDURE — 6370000000 HC RX 637 (ALT 250 FOR IP): Performed by: PHYSICIAN ASSISTANT

## 2019-12-18 PROCEDURE — 99283 EMERGENCY DEPT VISIT LOW MDM: CPT

## 2019-12-18 PROCEDURE — 72040 X-RAY EXAM NECK SPINE 2-3 VW: CPT

## 2019-12-18 RX ORDER — TIZANIDINE 4 MG/1
4 TABLET ORAL EVERY 8 HOURS PRN
Qty: 30 TABLET | Refills: 0 | Status: SHIPPED | OUTPATIENT
Start: 2019-12-18

## 2019-12-18 RX ORDER — LIDOCAINE 4 G/G
1 PATCH TOPICAL DAILY
Status: DISCONTINUED | OUTPATIENT
Start: 2019-12-18 | End: 2019-12-18 | Stop reason: HOSPADM

## 2019-12-18 RX ORDER — PREDNISONE 20 MG/1
60 TABLET ORAL ONCE
Status: COMPLETED | OUTPATIENT
Start: 2019-12-18 | End: 2019-12-18

## 2019-12-18 RX ORDER — PREDNISONE 10 MG/1
TABLET ORAL
Qty: 30 TABLET | Refills: 0 | Status: SHIPPED | OUTPATIENT
Start: 2019-12-18 | End: 2019-12-28

## 2019-12-18 RX ADMIN — PREDNISONE 60 MG: 20 TABLET ORAL at 12:00

## 2019-12-18 ASSESSMENT — ENCOUNTER SYMPTOMS
CHEST TIGHTNESS: 0
SHORTNESS OF BREATH: 0
NAUSEA: 0
VOMITING: 0
ABDOMINAL PAIN: 0
BACK PAIN: 0
DIARRHEA: 0
VOICE CHANGE: 0
TROUBLE SWALLOWING: 0

## 2019-12-18 ASSESSMENT — PAIN DESCRIPTION - ORIENTATION: ORIENTATION: LEFT

## 2019-12-18 ASSESSMENT — PAIN DESCRIPTION - DESCRIPTORS: DESCRIPTORS: BURNING;TINGLING

## 2019-12-18 ASSESSMENT — PAIN DESCRIPTION - LOCATION: LOCATION: ARM;NECK

## 2019-12-18 ASSESSMENT — PAIN SCALES - GENERAL: PAINLEVEL_OUTOF10: 7

## 2020-03-13 ENCOUNTER — APPOINTMENT (OUTPATIENT)
Dept: GENERAL RADIOLOGY | Age: 35
End: 2020-03-13
Payer: COMMERCIAL

## 2020-03-13 ENCOUNTER — HOSPITAL ENCOUNTER (EMERGENCY)
Age: 35
Discharge: HOME | End: 2020-03-14
Payer: COMMERCIAL

## 2020-03-13 ENCOUNTER — APPOINTMENT (OUTPATIENT)
Dept: ULTRASOUND IMAGING | Age: 35
End: 2020-03-13
Payer: COMMERCIAL

## 2020-03-13 ENCOUNTER — APPOINTMENT (OUTPATIENT)
Dept: CT IMAGING | Age: 35
End: 2020-03-13
Payer: COMMERCIAL

## 2020-03-13 LAB
ALBUMIN SERPL-MCNC: 4.3 G/DL (ref 3.4–5)
ALP BLD-CCNC: 67 U/L (ref 40–129)
ALT SERPL-CCNC: 13 U/L (ref 10–40)
ANION GAP SERPL CALCULATED.3IONS-SCNC: 12 MMOL/L (ref 3–16)
AST SERPL-CCNC: 28 U/L (ref 15–37)
BASOPHILS ABSOLUTE: 0 K/UL (ref 0–0.2)
BASOPHILS RELATIVE PERCENT: 0.2 %
BILIRUB SERPL-MCNC: 0.4 MG/DL (ref 0–1)
BILIRUBIN DIRECT: <0.2 MG/DL (ref 0–0.3)
BILIRUBIN, INDIRECT: NORMAL MG/DL (ref 0–1)
BUN BLDV-MCNC: 9 MG/DL (ref 7–20)
CALCIUM SERPL-MCNC: 9.5 MG/DL (ref 8.3–10.6)
CHLORIDE BLD-SCNC: 100 MMOL/L (ref 99–110)
CO2: 24 MMOL/L (ref 21–32)
CREAT SERPL-MCNC: 0.9 MG/DL (ref 0.6–1.1)
EOSINOPHILS ABSOLUTE: 0.1 K/UL (ref 0–0.6)
EOSINOPHILS RELATIVE PERCENT: 0.6 %
GFR AFRICAN AMERICAN: >60
GFR NON-AFRICAN AMERICAN: >60
GLUCOSE BLD-MCNC: 80 MG/DL (ref 70–99)
HCG QUALITATIVE: NEGATIVE
HCT VFR BLD CALC: 43 % (ref 36–48)
HEMOGLOBIN: 13.8 G/DL (ref 12–16)
LYMPHOCYTES ABSOLUTE: 1.4 K/UL (ref 1–5.1)
LYMPHOCYTES RELATIVE PERCENT: 12.3 %
MCH RBC QN AUTO: 29.7 PG (ref 26–34)
MCHC RBC AUTO-ENTMCNC: 32.2 G/DL (ref 31–36)
MCV RBC AUTO: 92.4 FL (ref 80–100)
MONOCYTES ABSOLUTE: 0.4 K/UL (ref 0–1.3)
MONOCYTES RELATIVE PERCENT: 3 %
NEUTROPHILS ABSOLUTE: 9.8 K/UL (ref 1.7–7.7)
NEUTROPHILS RELATIVE PERCENT: 83.9 %
PDW BLD-RTO: 13.4 % (ref 12.4–15.4)
PLATELET # BLD: 217 K/UL (ref 135–450)
PMV BLD AUTO: 9.9 FL (ref 5–10.5)
POTASSIUM SERPL-SCNC: 3.7 MMOL/L (ref 3.5–5.1)
RAPID INFLUENZA  B AGN: NEGATIVE
RAPID INFLUENZA A AGN: NEGATIVE
RBC # BLD: 4.65 M/UL (ref 4–5.2)
SODIUM BLD-SCNC: 136 MMOL/L (ref 136–145)
TOTAL PROTEIN: 7.5 G/DL (ref 6.4–8.2)
TROPONIN: <0.01 NG/ML
WBC # BLD: 11.7 K/UL (ref 4–11)

## 2020-03-13 PROCEDURE — 84703 CHORIONIC GONADOTROPIN ASSAY: CPT

## 2020-03-13 PROCEDURE — 2580000003 HC RX 258: Performed by: PHYSICIAN ASSISTANT

## 2020-03-13 PROCEDURE — 93005 ELECTROCARDIOGRAM TRACING: CPT

## 2020-03-13 PROCEDURE — 74177 CT ABD & PELVIS W/CONTRAST: CPT

## 2020-03-13 PROCEDURE — 76705 ECHO EXAM OF ABDOMEN: CPT

## 2020-03-13 PROCEDURE — 80076 HEPATIC FUNCTION PANEL: CPT

## 2020-03-13 PROCEDURE — 6360000002 HC RX W HCPCS: Performed by: PHYSICIAN ASSISTANT

## 2020-03-13 PROCEDURE — 87804 INFLUENZA ASSAY W/OPTIC: CPT

## 2020-03-13 PROCEDURE — 96375 TX/PRO/DX INJ NEW DRUG ADDON: CPT

## 2020-03-13 PROCEDURE — 80048 BASIC METABOLIC PNL TOTAL CA: CPT

## 2020-03-13 PROCEDURE — 96374 THER/PROPH/DIAG INJ IV PUSH: CPT

## 2020-03-13 PROCEDURE — 85025 COMPLETE CBC W/AUTO DIFF WBC: CPT

## 2020-03-13 PROCEDURE — 84484 ASSAY OF TROPONIN QUANT: CPT

## 2020-03-13 PROCEDURE — 99285 EMERGENCY DEPT VISIT HI MDM: CPT

## 2020-03-13 PROCEDURE — 71046 X-RAY EXAM CHEST 2 VIEWS: CPT

## 2020-03-13 PROCEDURE — 6360000004 HC RX CONTRAST MEDICATION: Performed by: PHYSICIAN ASSISTANT

## 2020-03-13 RX ORDER — MORPHINE SULFATE 4 MG/ML
4 INJECTION, SOLUTION INTRAMUSCULAR; INTRAVENOUS ONCE
Status: COMPLETED | OUTPATIENT
Start: 2020-03-13 | End: 2020-03-13

## 2020-03-13 RX ORDER — 0.9 % SODIUM CHLORIDE 0.9 %
1000 INTRAVENOUS SOLUTION INTRAVENOUS ONCE
Status: COMPLETED | OUTPATIENT
Start: 2020-03-13 | End: 2020-03-14

## 2020-03-13 RX ORDER — ONDANSETRON 2 MG/ML
4 INJECTION INTRAMUSCULAR; INTRAVENOUS ONCE
Status: COMPLETED | OUTPATIENT
Start: 2020-03-13 | End: 2020-03-13

## 2020-03-13 RX ADMIN — ONDANSETRON 4 MG: 2 INJECTION INTRAMUSCULAR; INTRAVENOUS at 21:52

## 2020-03-13 RX ADMIN — IOPAMIDOL 75 ML: 755 INJECTION, SOLUTION INTRAVENOUS at 23:21

## 2020-03-13 RX ADMIN — MORPHINE SULFATE 4 MG: 4 INJECTION INTRAVENOUS at 21:52

## 2020-03-13 RX ADMIN — SODIUM CHLORIDE 1000 ML: 9 INJECTION, SOLUTION INTRAVENOUS at 21:52

## 2020-03-13 ASSESSMENT — PAIN SCALES - GENERAL
PAINLEVEL_OUTOF10: 10
PAINLEVEL_OUTOF10: 5

## 2020-03-13 ASSESSMENT — PAIN DESCRIPTION - PAIN TYPE: TYPE: ACUTE PAIN

## 2020-03-14 VITALS
OXYGEN SATURATION: 99 % | TEMPERATURE: 98 F | HEIGHT: 63 IN | SYSTOLIC BLOOD PRESSURE: 96 MMHG | HEART RATE: 70 BPM | RESPIRATION RATE: 17 BRPM | WEIGHT: 170 LBS | BODY MASS INDEX: 30.12 KG/M2 | DIASTOLIC BLOOD PRESSURE: 56 MMHG

## 2020-03-14 LAB
EKG ATRIAL RATE: 85 BPM
EKG DIAGNOSIS: NORMAL
EKG P AXIS: 52 DEGREES
EKG P-R INTERVAL: 144 MS
EKG Q-T INTERVAL: 352 MS
EKG QRS DURATION: 70 MS
EKG QTC CALCULATION (BAZETT): 418 MS
EKG R AXIS: -3 DEGREES
EKG T AXIS: 5 DEGREES
EKG VENTRICULAR RATE: 85 BPM

## 2020-03-14 PROCEDURE — 93010 ELECTROCARDIOGRAM REPORT: CPT | Performed by: INTERNAL MEDICINE

## 2020-03-14 RX ORDER — HYDROCODONE BITARTRATE AND ACETAMINOPHEN 5; 325 MG/1; MG/1
1 TABLET ORAL EVERY 6 HOURS PRN
Qty: 10 TABLET | Refills: 0 | Status: SHIPPED | OUTPATIENT
Start: 2020-03-14 | End: 2020-03-17

## 2020-03-14 ASSESSMENT — ENCOUNTER SYMPTOMS
DIARRHEA: 0
SHORTNESS OF BREATH: 0
NAUSEA: 0
VOMITING: 0
ABDOMINAL PAIN: 1

## 2020-03-14 NOTE — ED NOTES
Nursing Discharge Notes:  -Patient discharged at this time in no acute distress after verbalizing understanding of discharge instructions.  -A copy of the AVS was reviewed with pt.  -Pt received applicable scripts which were reviewed with pt by this RN. -Pt was given the opportunity to ask questions before signing for discharge. -IV removed and dressing applied.    -Pt left ambulatory to lobby / discharge area. Patient Education:  Learner - Patient. Motivation and Readiness To Learn - Medium to High  Barriers To Learning - None  Learning Preference / Provided Instructions - Both written and verbal discharge instructions.        Jamar Castellano, SEA  03/14/20 [id number]

## 2020-03-14 NOTE — ED NOTES
Bed: 11  Expected date:   Expected time:   Means of arrival:   Comments:     Sarbjit Sousa RN  03/13/20 2031

## 2020-03-14 NOTE — ED PROVIDER NOTES
[03/13/20 2037]   BP Temp Temp Source Pulse Resp SpO2 Height Weight   123/68 98 °F (36.7 °C) Oral 94 16 100 % 5' 3\" (1.6 m) 170 lb (77.1 kg)       Physical Exam  Vitals signs and nursing note reviewed. Constitutional:       Appearance: She is well-developed. She is not diaphoretic. HENT:      Head: Normocephalic and atraumatic. Nose: Nose normal.   Eyes:      General:         Right eye: No discharge. Left eye: No discharge. Neck:      Musculoskeletal: Normal range of motion and neck supple. Cardiovascular:      Rate and Rhythm: Normal rate and regular rhythm. Heart sounds: Normal heart sounds. No murmur. No gallop. Pulmonary:      Effort: Pulmonary effort is normal. No respiratory distress. Breath sounds: Normal breath sounds. No wheezing or rales. Musculoskeletal: Normal range of motion. Skin:     General: Skin is warm and dry. Coloration: Skin is not pale. Neurological:      Mental Status: She is alert and oriented to person, place, and time.    Psychiatric:         Behavior: Behavior normal.         DIAGNOSTIC RESULTS   LABS:    Labs Reviewed   CBC WITH AUTO DIFFERENTIAL - Abnormal; Notable for the following components:       Result Value    WBC 11.7 (*)     Neutrophils Absolute 9.8 (*)     All other components within normal limits    Narrative:     Performed at:  [facility] [address], [place], [address] [employer] [address] [employer]   Phone [phone]   RAPID INFLUENZA A/B ANTIGENS    Narrative:     Performed at:  [facility] [address], [place], [address]   Phone [phone]   BASIC METABOLIC PANEL    Narrative:     Performed at:  [facility] [address], [place], [address]   Phone [phone]   TROPONIN    Narrative:     Performed at:  [facility] [address], [place], [address]   Phone [phone]   HCG, SERUM, QUALITATIVE Narrative:     Performed at:  [facility]  [address],  [place] [place] [facility]   Phone [phone]   HEPATIC FUNCTION PANEL    Narrative:     Performed at:  [facility]  [address],  [place], [place]   Phone [phone]   URINE RT REFLEX TO CULTURE       All other labs were within normal range or not returned as of this dictation. EKG: All EKG's are interpreted by the Emergency Department Physician in the absence of a cardiologist.  Please see their note for interpretation of EKG. RADIOLOGY:   Non-plain film images such as CT, Ultrasound and MRI are read by the radiologist. Plain radiographic images are visualized and preliminarily interpreted by the ED Provider with the below findings:        Interpretation per the Radiologist below, if available at the time of this note:    CT ABDOMEN PELVIS W IV CONTRAST Additional Contrast? None   Final Result   Periportal edema. That is a nonspecific finding, and can be secondary to   localized disease (such as hepatitis), but can also be secondary to systemic   etiologies (such as third-spacing). No other acute abnormalities identified. US GALLBLADDER RUQ   Final Result   1. No evidence of cholelithiasis or acute cholecystitis. 2. Hepatic steatosis. XR CHEST STANDARD (2 VW)   Final Result   No acute cardiopulmonary process. Xr Chest Standard (2 Vw)    Result Date: 3/14/2020  EXAMINATION: TWO XRAY VIEWS OF THE CHEST 3/13/2020 9:04 pm COMPARISON: September 15, 2013 HISTORY: ORDERING SYSTEM PROVIDED HISTORY: chest pain, sob TECHNOLOGIST PROVIDED HISTORY: Reason for exam:->chest pain, sob Reason for Exam: Abdominal and chest pain, sob, nausea Acuity: Unknown Type of Exam: Unknown FINDINGS: The cardiomediastinal silhouette is normal in size. The lungs are clear. No pleural effusion or pneumothorax is present. No acute cardiopulmonary process.      Ct Abdomen Pelvis W Iv Contrast Additional Contrast? None    Result Date: 3/13/2020  EXAMINATION: CT OF THE ABDOMEN AND PELVIS WITH CONTRAST 3/13/2020 8:21 pm TECHNIQUE: CT of the abdomen and pelvis was performed with the administration of intravenous contrast. Multiplanar reformatted images are provided for review. Dose modulation, iterative reconstruction, and/or weight based adjustment of the mA/kV was utilized to reduce the radiation dose to as low as reasonably achievable. COMPARISON: Abdominal ultrasound performed earlier today. CT abdomen pelvis, 05/10/2018 HISTORY: ORDERING SYSTEM PROVIDED HISTORY: ruq pain, negative u/s TECHNOLOGIST PROVIDED HISTORY: Reason for exam:->ruq pain, negative u/s Additional Contrast?->None Reason for Exam: Chest Pain (CP started this afternoon. pt brought in by [facility]. pain going up from abdomen to chest. worse on right but present on both sides. pain radiates to back) Acuity: Acute Type of Exam: Initial FINDINGS: Lower Chest: Visualized lungs are clear. Base of the heart is unremarkable. No pericardial effusion. Visualized extra thoracic soft tissues are unremarkable. Organs: Periportal edema is noted. No focal hepatic abnormalities are identified. The portal vein is patent. Pericholecystic edema is noted as well. The gallbladder is otherwise unremarkable. Spleen enhances normally. Normal adrenals. Normal pancreas. No acute renal abnormalities are identified. No hydronephrosis or hydroureter is seen. GI/Bowel: No large bowel abnormalities are identified. The appendix is unremarkable, without evidence of appendicitis. Distal esophagus, stomach, duodenal sweep, and the remainder of the small bowel are unremarkable in appearance. Pelvis: Uterus and adnexa regions are unremarkable for the patient's age. There is a scant amount of free pelvic fluid. Urinary bladder is unremarkable. Peritoneum/Retroperitoneum: Abdominal aorta is normal in caliber.   Superior mesenteric artery is enhancing. No retroperitoneal lymphadenopathy is identified. Bones/Soft Tissues: The extra-abdominal and extra pelvic soft tissues are unremarkable. No osteolytic or osteoblastic bone lesions are seen. Periportal edema. That is a nonspecific finding, and can be secondary to localized disease (such as hepatitis), but can also be secondary to systemic etiologies (such as third-spacing). No other acute abnormalities identified. Us Gallbladder Ruq    Result Date: 3/13/2020  EXAMINATION: RIGHT UPPER QUADRANT ULTRASOUND 3/13/2020 10:08 pm COMPARISON: 09/10/2018 HISTORY: ORDERING SYSTEM PROVIDED HISTORY: ruq pain TECHNOLOGIST PROVIDED HISTORY: Reason for exam:->ruq pain FINDINGS: LIVER:  The liver demonstrates increased echogenicity without evidence of intrahepatic biliary ductal dilatation. BILIARY SYSTEM:  Gallbladder is unremarkable without evidence of pericholecystic fluid, wall thickening or stones. Negative sonographic Tijerina's sign. Common bile duct is within normal limits measuring 5 mm. RIGHT KIDNEY: The right kidney is grossly unremarkable without evidence of hydronephrosis. PANCREAS:  Visualized portions of the pancreas are unremarkable. OTHER: No evidence of right upper quadrant ascites. 1. No evidence of cholelithiasis or acute cholecystitis. 2. Hepatic steatosis.            PROCEDURES   Unless otherwise noted below, none     Procedures    CRITICAL CARE TIME   N/A    CONSULTS:  None      EMERGENCY DEPARTMENT COURSE and DIFFERENTIAL DIAGNOSIS/MDM:   Vitals:    Vitals:    03/14/20 0045 03/14/20 0100 03/14/20 0115 03/14/20 0130   BP:  (!) 109/59  (!) 96/56   Pulse: 71 64 66 70   Resp: 15 15 22 17   Temp:       TempSrc:       SpO2:    99%   Weight:       Height:           Patient was given the following medications:  Medications   0.9 % sodium chloride bolus (0 mLs Intravenous Stopped 3/14/20 0000)   ondansetron (ZOFRAN) injection 4 mg (4 mg Intravenous Given 3/13/20 2152)   morphine injection 4 mg (4 mg Intravenous Given 3/13/20 0824)   iopamidol (ISOVUE-370) 76 % injection 75 mL (75 mLs Intravenous Given 3/13/20 2540)       Patient presents emergency department for evaluation of abdominal pain. Patient has right upper quadrant abdominal pain that radiates through to her back and up into her chest.  She denies any overt chest pain and heart rate is regular without murmurs rubs or gallops. Patient also has clear lungs auscultation bilaterally. Abdomen is tender to the right upper quadrant without rebound or guarding. Negative Tijerina sign. Patient denies any symptoms of dysuria and has no CVA tenderness. Patient laboratory evaluation is unremarkable. Patient has a slightly elevated white count at 11.7. Patient ultrasound of the left right upper quadrant is negative for evidence of gallbladder disease. Patient has some hepatic steatosis visualized on ultrasound. CT of the abdomen also shows some periportal edema. Patient has no elevation in her LFTs at this time. There is no evidence of obstruction on imaging or laboratory evaluation. Patient does feel better after morphine Zofran and fluids here in the emergency department. Patient will be referred to GI for further management of her possible gallbladder dysfunction and fatty liver disease. Patient states she does not drink heavily only socially and not to excess. She has never been told she has high cholesterol. Patient is amenable to outpatient follow-up and is given some pain medications until Monday when she can schedule follow up. Patient encouraged to return for any worsening symptoms. At time of discharge her vitals are stable and she is afebrile. Pain is controlled. Patient was encouraged to alter her diet to avoid fatty foods or fried foods as these may exacerbate her symptoms. Patient states she did eat a fried chicken finger just prior to when the pain started this evening.     I see nothing that would suggest an acute abdomen at

## 2020-03-24 ENCOUNTER — TELEPHONE (OUTPATIENT)
Dept: PRIMARY CARE CLINIC | Age: 35
End: 2020-03-24